# Patient Record
(demographics unavailable — no encounter records)

---

## 2025-01-15 NOTE — HISTORY OF PRESENT ILLNESS
[postmenopausal] : postmenopausal [N] : Patient is not sexually active [Y] : Positive pregnancy history [Menarche Age: ____] : age at menarche was [unfilled] [No] : Patient does not have concerns regarding sex [Previously active] : previously active [Men] : men [TextBox_4] : well woman visit Pt had AUGUSTUS for fibroid uterus, 1988 Pt had episode bleeding 2 months ago [Mammogramdate] : 07/13/24 [TextBox_19] : BR-1 [PapSmeardate] : 2023 [TextBox_31] : WNL [LMPDate] : 1987 [PGHxTotal] : 5 [[facility] CenterxFulerm] : 5 [[facility] GilbertxLiving] : 5 [FreeTextEntry1] : 1987 [FreeTextEntry2] :  [FreeTextEntry4] : Denies abuse